# Patient Record
Sex: FEMALE | Race: ASIAN | NOT HISPANIC OR LATINO
[De-identification: names, ages, dates, MRNs, and addresses within clinical notes are randomized per-mention and may not be internally consistent; named-entity substitution may affect disease eponyms.]

---

## 2021-08-25 PROBLEM — Z00.00 ENCOUNTER FOR PREVENTIVE HEALTH EXAMINATION: Status: ACTIVE | Noted: 2021-08-25

## 2021-09-23 ENCOUNTER — APPOINTMENT (OUTPATIENT)
Dept: NEUROSURGERY | Facility: CLINIC | Age: 56
End: 2021-09-23
Payer: MEDICAID

## 2021-09-23 VITALS
DIASTOLIC BLOOD PRESSURE: 78 MMHG | BODY MASS INDEX: 27.79 KG/M2 | HEART RATE: 69 BPM | WEIGHT: 151 LBS | HEIGHT: 62 IN | SYSTOLIC BLOOD PRESSURE: 126 MMHG | OXYGEN SATURATION: 99 % | TEMPERATURE: 97.1 F

## 2021-09-23 PROCEDURE — 99203 OFFICE O/P NEW LOW 30 MIN: CPT

## 2021-10-05 NOTE — HISTORY OF PRESENT ILLNESS
[> 3 months] : more  than 3 months [FreeTextEntry1] : low back pain. [de-identified] : She has had pain in the back and leg pain. The pain is worse in the morning.  She has no numbness or tingling of the leg. She can walk three blocks. She had PT but made it worse. The bowel and bladder function is normal. She takes gabapentin for pain. She has not done pain management. The pain is worse with activity and improved by rest.

## 2021-10-05 NOTE — ASSESSMENT
[FreeTextEntry1] : She has low back pain with a grade 2 spondylolisthesis. She has not done pain management yet. Although, it may not be curable or long lasting I think it is a great option prior to surgery. I will also have her flexion extension films . I will see her back after the injection and xrays.

## 2021-10-05 NOTE — PHYSICAL EXAM
[Straight-Leg Raise Test - Left] : straight leg raise of the left leg was positive [L-Spine ___ (level)] : ~Ulevel [unfilled] lumbar spine [Restricted] : was restricted [Pain] : was painful [Pain / Temp Decrease Sole Of Foot & Posterior Leg Left Only] : S1 sensory impairment [Pain / Temp Decrease Sole Of Foot & Posterior Leg Bilateral] : S1 sensory impairment [4] : 4/5 Great Toe Extension (L5) [5] : 5/5 Ankle Plantar Flexion (S1) [Straight-Leg Raise Test - Right] : straight leg raise  of the right leg was negative [FreeTextEntry2] : left hip

## 2021-10-14 ENCOUNTER — APPOINTMENT (OUTPATIENT)
Dept: NEUROSURGERY | Facility: CLINIC | Age: 56
End: 2021-10-14

## 2022-12-28 ENCOUNTER — APPOINTMENT (OUTPATIENT)
Dept: ORTHOPEDIC SURGERY | Facility: CLINIC | Age: 57
End: 2022-12-28

## 2022-12-28 VITALS
HEIGHT: 62 IN | WEIGHT: 151 LBS | DIASTOLIC BLOOD PRESSURE: 79 MMHG | SYSTOLIC BLOOD PRESSURE: 128 MMHG | BODY MASS INDEX: 27.79 KG/M2

## 2022-12-28 PROCEDURE — 99204 OFFICE O/P NEW MOD 45 MIN: CPT

## 2022-12-28 PROCEDURE — 72110 X-RAY EXAM L-2 SPINE 4/>VWS: CPT

## 2022-12-28 NOTE — HISTORY OF PRESENT ILLNESS
[de-identified] : 56 yo female with chronic low back pain with bilateral posterior thigh to calf radiculopathy, left left worse than right. She is unable to walk more than 2 blocks due to pain. She has been treated conservatively in the past while in [place] and a year ago a neurosurgeon, Dr. Zhu recommended surgery for her. She denies any bowel or bladder dysfunction or saddle anesthesia. Her most recent MRI is over a year old. She has not had epidurals in the past. She tried physical therapy a few years ago with minimal relief of her symptoms. She is unable to sit or stand for extended period of time due to pain.

## 2022-12-28 NOTE — ASSESSMENT
[FreeTextEntry1] : I had a long discussion with the patient regards to her treatment plan and diagnosis.  She may be a surgical candidate given the degree of spondylolisthesis and her foraminal stenosis.  Furthermore she does have radicular complaints which are consistent with her area of stenosis.  I do think that trying a course of physical therapy, pain management would be appropriate.  We have prescribed her a regimen of this today.  I will see her back in approximately 3 to 4 weeks to keep a close clinical eye on the patient.  All questions were answered.

## 2023-01-16 ENCOUNTER — TRANSCRIPTION ENCOUNTER (OUTPATIENT)
Age: 58
End: 2023-01-16

## 2023-02-01 ENCOUNTER — APPOINTMENT (OUTPATIENT)
Dept: ORTHOPEDIC SURGERY | Facility: CLINIC | Age: 58
End: 2023-02-01
Payer: MEDICAID

## 2023-02-01 PROCEDURE — 99214 OFFICE O/P EST MOD 30 MIN: CPT

## 2023-02-01 NOTE — PHYSICAL EXAM
[de-identified] : Lumbar Physical Exam\par \par Antalgic gait\par \par Reflexes\par Patellar - normal\par Gastroc - normal\par Clonus - No\par \par Hip Exam - Normal\par \par Straight leg raise - none\par \par Pulses - 2+ dp/pt\par \par Range of motion - normal\par \par Sensation \par Sensation intact to light touch in L1, L2, L3, L4, L5 and S1 dermatomes bilaterally\par \par Motor\par 	IP	Quad	HS	TA	Gastroc	EHL\par Right	5/5	5/5	5/5	5/5	5/5	5/5\par Left	5/5	5/5	5/5	5/5	5/5	5/5 [de-identified] : Lumbar radiographs\par Grade 2 spondylolisthesis L5-S1\par \par Lumbar MRI reviewed from 2021\par Significant foraminal stenosis as expected with his degree of spondylolisthesis at L5-S1

## 2023-02-01 NOTE — ASSESSMENT
[FreeTextEntry1] : I had a long discussion with the patient regards to her treatment plan and diagnosis.  She may be a surgical candidate given the degree of spondylolisthesis and her foraminal stenosis.  Furthermore she does have radicular complaints which are consistent with her area of stenosis.  At this time, I do think that trying a course of physical therapy, pain management would be appropriate. I recommend following up with pain management for further treatment plan. I also advised her to obtain the MRI films and report at the location where she had the MRI done.  I will see her back in approximately 2 weeks to keep a close clinical eye on the patient.  All questions were answered.

## 2023-02-01 NOTE — HISTORY OF PRESENT ILLNESS
[de-identified] : Today, patient states her symptoms are persistent and unchanged since last visit. She states the pain is worse in the morning with associated stiffness. Patient reports obtaining a lumbar MRI but does not have the films or report today. She states she also went for an EMG study. Patient has previously seen pain management Dr. Singleton but injection was not approved. She reports doing physical therapy in the past with minimal relief to symptoms. \par \par 12/28/2022\par 56 yo female with chronic low back pain with bilateral posterior thigh to calf radiculopathy, left left worse than right. She is unable to walk more than 2 blocks due to pain. She has been treated conservatively in the past while in [place] and a year ago a neurosurgeon, Dr. Zhu recommended surgery for her. She denies any bowel or bladder dysfunction or saddle anesthesia. Her most recent MRI is over a year old. She has not had epidurals in the past. She tried physical therapy a few years ago with minimal relief of her symptoms. She is unable to sit or stand for extended period of time due to pain.

## 2023-02-01 NOTE — ADDENDUM
[FreeTextEntry1] : I, Morgan Gonzalez, acted solely as a scribe for Dr. Ricardo Batista MD on this date 02/01/2023  .\par  \par All medical record entries made by the Scribe were at my, Dr. Ricardo Batista MD., direction and personally dictated by me on 02/01/2023 . I have reviewed the chart and agree that the record accurately reflects my personal performance of the history, physical exam, assessment and plan. I have also personally directed, reviewed, and agreed with the chart.

## 2023-02-15 ENCOUNTER — APPOINTMENT (OUTPATIENT)
Dept: ORTHOPEDIC SURGERY | Facility: CLINIC | Age: 58
End: 2023-02-15
Payer: MEDICAID

## 2023-02-15 VITALS
DIASTOLIC BLOOD PRESSURE: 63 MMHG | WEIGHT: 151 LBS | BODY MASS INDEX: 27.79 KG/M2 | HEIGHT: 62 IN | SYSTOLIC BLOOD PRESSURE: 108 MMHG

## 2023-02-15 PROCEDURE — 99214 OFFICE O/P EST MOD 30 MIN: CPT

## 2023-02-15 NOTE — ASSESSMENT
[FreeTextEntry1] : I had a long discussion with the patient regards her treatment plan and diagnosis.  At this point we will continue with conservative care.  She will likely have a injection type procedure with Dr. Salinas over the coming weeks.  She should continue with physical therapy.  I will see her back in 5 to 6 weeks.  Encouraged to reach out sooner if she has any worsening symptoms.

## 2023-02-15 NOTE — PHYSICAL EXAM
[de-identified] : Cervical Physical Exam\par \par Gait - Antalgic Gait\par \par Reflexes\par Biceps - Normal\par Triceps - Normal\par Brachioradialis - Normal\par Patellar - Normal\par Gastroc - Normal\par Clonus -No\par \par Cool´s - None\par \par Shoulder Exam - Normal\par \par Spurling´s - None\par \par Wrist Pulses -2+ radial/ulnar\par \par Foot Pulses -2+ DP/PT\par \par Cervical range of motion - Normal\par \par Sensation \par C5-T1 sensation intact to light touch bilaterally\par \par L1-S1 sensation intact to light touch bilaterally\par \par Motor\par \par 	Deltoid	Biceps	Triceps	WF	WE	IO	\par Right	5/5	5/5	5/5	5/5	5/5	5/5	5/5\par Left	5/5	5/5	5/5	5/5	5/5	5/5	5/5\par \par Lumbar Physical Exam\par \par Antalgic gait\par \par Reflexes\par Patellar - normal\par Gastroc - normal\par Clonus - No\par \par Hip Exam - Normal\par \par Straight leg raise - none\par \par Pulses - 2+ dp/pt\par \par Range of motion - normal\par \par Sensation \par Sensation intact to light touch in L1, L2, L3, L4, L5 and S1 dermatomes bilaterally\par \par Motor\par 	IP	Quad	HS	TA	Gastroc	EHL\par Right	5/5	5/5	5/5	5/5	5/5	5/5\par Left	5/5	5/5	5/5	5/5	5/5	5/5 [de-identified] : Lumbar radiographs\par Grade 2 spondylolisthesis L5-S1\par \par Lumbar MRI reviewed from 2021\par Significant foraminal stenosis as expected with his degree of spondylolisthesis at L5-S1\par \par Cervical radiographs \par No instability with flexion or extension \par normal appearing disc spaces \par \par Cervical MRI\par No areas of critical central or foraminal stenosis

## 2023-02-15 NOTE — HISTORY OF PRESENT ILLNESS
[de-identified] : 57 y.o female presents for follow-up visit for her neck pain and radiculopathy. Pt reports pain radiates down to her shoulder and the posterior aspect of her LT leg. Pt reports she can walk 2 blocks but would have to stop afterwards due to the pain. Pt went to see Dr. Salinas but has not received the injection yet. Pt reports going to physical therapy with minor relief of sxs but reports pain is still persistent. \par \par 02/01/2023\par Today, patient states her symptoms are persistent and unchanged since last visit. She states the pain is worse in the morning with associated stiffness. Patient reports obtaining a lumbar MRI but does not have the films or report today. She states she also went for an EMG study. Patient has previously seen pain management Dr. Singleton but injection was not approved. She reports doing physical therapy in the past with minimal relief to symptoms. \par \par 12/28/2022\par 56 yo female with chronic low back pain with bilateral posterior thigh to calf radiculopathy, left left worse than right. She is unable to walk more than 2 blocks due to pain. She has been treated conservatively in the past while in [facility] and a year ago a neurosurgeon, Dr. Zhu recommended surgery for her. She denies any bowel or bladder dysfunction or saddle anesthesia. Her most recent MRI is over a year old. She has not had epidurals in the past. She tried physical therapy a few years ago with minimal relief of her symptoms. She is unable to sit or stand for extended period of time due to pain.

## 2023-03-29 ENCOUNTER — APPOINTMENT (OUTPATIENT)
Dept: ORTHOPEDIC SURGERY | Facility: CLINIC | Age: 58
End: 2023-03-29
Payer: MEDICAID

## 2023-03-29 VITALS
SYSTOLIC BLOOD PRESSURE: 103 MMHG | WEIGHT: 151 LBS | DIASTOLIC BLOOD PRESSURE: 63 MMHG | HEIGHT: 62 IN | BODY MASS INDEX: 27.79 KG/M2

## 2023-03-29 DIAGNOSIS — M25.559 PAIN IN UNSPECIFIED HIP: ICD-10-CM

## 2023-03-29 PROCEDURE — 72190 X-RAY EXAM OF PELVIS: CPT

## 2023-03-29 PROCEDURE — 99214 OFFICE O/P EST MOD 30 MIN: CPT

## 2023-03-29 NOTE — PHYSICAL EXAM
[de-identified] : Cervical Physical Exam\par \par Gait - Antalgic Gait\par \par Reflexes\par Biceps - Normal\par Triceps - Normal\par Brachioradialis - Normal\par Patellar - Normal\par Gastroc - Normal\par Clonus -No\par \par Cool´s - None\par \par Shoulder Exam - Normal\par \par Spurling´s - None\par \par Wrist Pulses -2+ radial/ulnar\par \par Foot Pulses -2+ DP/PT\par \par Cervical range of motion - Normal\par \par Sensation \par C5-T1 sensation intact to light touch bilaterally\par \par L1-S1 sensation intact to light touch bilaterally\par \par Motor\par \par 	Deltoid	Biceps	Triceps	WF	WE	IO	\par Right	5/5	5/5	5/5	5/5	5/5	5/5	5/5\par Left	5/5	5/5	5/5	5/5	5/5	5/5	5/5\par \par Lumbar Physical Exam\par \par Antalgic gait\par \par Reflexes\par Patellar - normal\par Gastroc - normal\par Clonus - No\par \par Hip Exam - Normal\par \par Straight leg raise - none\par \par Pulses - 2+ dp/pt\par \par Range of motion - normal\par \par Sensation \par Sensation intact to light touch in L1, L2, L3, L4, L5 and S1 dermatomes bilaterally\par \par Motor\par 	IP	Quad	HS	TA	Gastroc	EHL\par Right	5/5	5/5	5/5	5/5	5/5	5/5\par Left	5/5	5/5	5/5	5/5	5/5	5/5 [de-identified] : Lumbar radiographs\par Grade 2 spondylolisthesis L5-S1\par \par Lumbar MRI reviewed from 2021\par Significant foraminal stenosis as expected with his degree of spondylolisthesis at L5-S1\par \par Cervical radiographs \par No instability with flexion or extension \par normal appearing disc spaces \par \par Cervical MRI\par No areas of critical central or foraminal stenosis\par \par Pelvis and right hip\par Mild degen\par I do not note any obvious lesion[facility]

## 2023-03-29 NOTE — ASSESSMENT
[FreeTextEntry1] : I had a long discussion with the patient regards her treatment plan and diagnosis.  We will get an MRI of her right hip based on the x-ray findings, there are concerns of the radiologist at [facility] of a possible proximal femoral lesion.  This has been ordered today.  We will try to rule out any malignancy.  I would encourage her to continue following up with pain management, continue to be active as well.

## 2023-03-29 NOTE — HISTORY OF PRESENT ILLNESS
[de-identified] : 57 year old female who presents for follow-up evaluation of her neck pain and radicular sxs down her arms.  She also has pain in her back and bilateral lower extremities.  She does have groin pain as well.  She has been seen and treated by Dr. Salinas and has had improvement in her overall symptoms.  She is here to follow-up regarding neck steps in her care.\par \par 02/15/2023\par 57 y.o female presents for follow-up visit for her neck pain and radiculopathy. Pt reports pain radiates down to her shoulder and the posterior aspect of her LT leg. Pt reports she can walk 2 blocks but would have to stop afterwards due to the pain. Pt went to see Dr. Salinsa but has not received the injection yet. Pt reports going to physical therapy with minor relief of sxs but reports pain is still persistent. \par \par 02/01/2023\par Today, patient states her symptoms are persistent and unchanged since last visit. She states the pain is worse in the morning with associated stiffness. Patient reports obtaining a lumbar MRI but does not have the films or report today. She states she also went for an EMG study. Patient has previously seen pain management Dr. Singleton but injection was not approved. She reports doing physical therapy in the past with minimal relief to symptoms. \par \par 12/28/2022\par 56 yo female with chronic low back pain with bilateral posterior thigh to calf radiculopathy, left left worse than right. She is unable to walk more than 2 blocks due to pain. She has been treated conservatively in the past while in [place] and a year ago a neurosurgeon, Dr. Zhu recommended surgery for her. She denies any bowel or bladder dysfunction or saddle anesthesia. Her most recent MRI is over a year old. She has not had epidurals in the past. She tried physical therapy a few years ago with minimal relief of her symptoms. She is unable to sit or stand for extended period of time due to pain.

## 2023-04-11 ENCOUNTER — OUTPATIENT (OUTPATIENT)
Dept: OUTPATIENT SERVICES | Facility: HOSPITAL | Age: 58
LOS: 1 days | End: 2023-04-11
Payer: MEDICAID

## 2023-04-11 ENCOUNTER — APPOINTMENT (OUTPATIENT)
Dept: MRI IMAGING | Facility: IMAGING CENTER | Age: 58
End: 2023-04-11
Payer: MEDICAID

## 2023-04-11 DIAGNOSIS — M25.559 PAIN IN UNSPECIFIED HIP: ICD-10-CM

## 2023-04-11 PROCEDURE — 73721 MRI JNT OF LWR EXTRE W/O DYE: CPT | Mod: 26,RT

## 2023-04-11 PROCEDURE — 73721 MRI JNT OF LWR EXTRE W/O DYE: CPT

## 2023-04-24 ENCOUNTER — NON-APPOINTMENT (OUTPATIENT)
Age: 58
End: 2023-04-24

## 2023-04-26 ENCOUNTER — APPOINTMENT (OUTPATIENT)
Dept: ORTHOPEDIC SURGERY | Facility: CLINIC | Age: 58
End: 2023-04-26
Payer: MEDICAID

## 2023-04-26 VITALS
BODY MASS INDEX: 27.79 KG/M2 | SYSTOLIC BLOOD PRESSURE: 111 MMHG | DIASTOLIC BLOOD PRESSURE: 73 MMHG | WEIGHT: 151 LBS | HEIGHT: 62 IN

## 2023-04-26 PROCEDURE — 99214 OFFICE O/P EST MOD 30 MIN: CPT

## 2023-04-26 NOTE — ADDENDUM
[FreeTextEntry1] : I, Julia Morales, acted solely as a scribe for Dr. Ricardo Batista MD on this date 04/26/2023  \par \par All medical record entries made by the Scribe were at my, Dr. Ricardo Batista MD., direction and personally dictated by me on 04/26/2023 . I have reviewed the chart and agree that the record accurately reflects my personal performance of the history, physical exam, assessment and plan. I have also personally directed, reviewed, and agreed with the chart.

## 2023-04-26 NOTE — ASSESSMENT
[FreeTextEntry1] : I had a long discussion with the patient regards her treatment plan and diagnosis. The patient is progressing well with conservative management. She will continue with physical therapy, gabapentin and Tylenol as medically indicted. The patient will f/u with me in 3 months for repeat clinical evaluation where we will discuss if she needs an epidural injection at [facility]. All questions were answered.

## 2023-04-26 NOTE — HISTORY OF PRESENT ILLNESS
[de-identified] : 57 year old female who presents for follow-up evaluation of her groin pain and neck pain,back pain that radiates down her b/l lower extremities. Today, the patient reports the pain occurs in the morning and then dissipates over the course of the day. Patient reports overall the pain is better relative to her previous appt. She is taking gabapentin for sxs with relief. \par Denies any bowel/bladder incontinence. Denies any saddle anesthesia. \par \par 03/29/2023\par 57 year old female who presents for follow-up evaluation of her neck pain and radicular sxs down her arms.  She also has pain in her back and bilateral lower extremities.  She does have groin pain as well.  She has been seen and treated by Dr. Salinas and has had improvement in her overall symptoms.  She is here to follow-up regarding neck steps in her care.\par \par 02/15/2023\par 57 y.o female presents for follow-up visit for her neck pain and radiculopathy. Pt reports pain radiates down to her shoulder and the posterior aspect of her LT leg. Pt reports she can walk 2 blocks but would have to stop afterwards due to the pain. Pt went to see Dr. Salinas but has not received the injection yet. Pt reports going to physical therapy with minor relief of sxs but reports pain is still persistent. \par \par 02/01/2023\par Today, patient states her symptoms are persistent and unchanged since last visit. She states the pain is worse in the morning with associated stiffness. Patient reports obtaining a lumbar MRI but does not have the films or report today. She states she also went for an EMG study. Patient has previously seen pain management Dr. Singleton but injection was not approved. She reports doing physical therapy in the past with minimal relief to symptoms. \par \par 12/28/2022\par 58 yo female with chronic low back pain with bilateral posterior thigh to calf radiculopathy, left left worse than right. She is unable to walk more than 2 blocks due to pain. She has been treated conservatively in the past while in [place] and a year ago a neurosurgeon, Dr. Zhu recommended surgery for her. She denies any bowel or bladder dysfunction or saddle anesthesia. Her most recent MRI is over a year old. She has not had epidurals in the past. She tried physical therapy a few years ago with minimal relief of her symptoms. She is unable to sit or stand for extended period of time due to pain.

## 2023-04-26 NOTE — PHYSICAL EXAM
[de-identified] : Cervical Physical Exam\par \par Gait - Antalgic Gait\par \par Reflexes\par Biceps - Normal\par Triceps - Normal\par Brachioradialis - Normal\par Patellar - Normal\par Gastroc - Normal\par Clonus -No\par \par Cool´s - None\par \par Shoulder Exam - Normal\par \par Spurling´s - None\par \par Wrist Pulses -2+ radial/ulnar\par \par Foot Pulses -2+ DP/PT\par \par Cervical range of motion - Normal\par \par Sensation \par C5-T1 sensation intact to light touch bilaterally\par \par L1-S1 sensation intact to light touch bilaterally\par \par Motor\par \par 	Deltoid	Biceps	Triceps	WF	WE	IO	\par Right	5/5	5/5	5/5	5/5	5/5	5/5	5/5\par Left	5/5	5/5	5/5	5/5	5/5	5/5	5/5\par \par Lumbar Physical Exam\par \par Antalgic gait\par \par Reflexes\par Patellar - normal\par Gastroc - normal\par Clonus - No\par \par Hip Exam - Normal\par \par Straight leg raise - none\par \par Pulses - 2+ dp/pt\par \par Range of motion - normal\par \par Sensation \par Sensation intact to light touch in L1, L2, L3, L4, L5 and S1 dermatomes bilaterally\par \par Motor\par 	IP	Quad	HS	TA	Gastroc	EHL\par Right	5/5	5/5	5/5	5/5	5/5	5/5\par Left	5/5	5/5	5/5	5/5	5/5	5/5 [de-identified] : Lumbar radiographs\par Grade 2 spondylolisthesis L5-S1\par \par Lumbar MRI reviewed from 2021\par Significant foraminal stenosis as expected with his degree of spondylolisthesis at L5-S1\par \par Cervical radiographs \par No instability with flexion or extension \par normal appearing disc spaces \par \par Cervical MRI\par No areas of critical central or foraminal stenosis\par \par Pelvis and right hip\par Mild degen\par I do not note any obvious lesion, hip xray from [facility] states concern over malignancy\par \par Hip MRI reviewed \par no lesions, fractures or acute complications noted

## 2023-07-26 ENCOUNTER — APPOINTMENT (OUTPATIENT)
Dept: ORTHOPEDIC SURGERY | Facility: CLINIC | Age: 58
End: 2023-07-26
Payer: MEDICAID

## 2023-07-26 VITALS
BODY MASS INDEX: 27.79 KG/M2 | SYSTOLIC BLOOD PRESSURE: 94 MMHG | HEIGHT: 62 IN | WEIGHT: 151 LBS | DIASTOLIC BLOOD PRESSURE: 58 MMHG

## 2023-07-26 PROCEDURE — 99214 OFFICE O/P EST MOD 30 MIN: CPT

## 2023-07-26 RX ORDER — MELOXICAM 15 MG/1
15 TABLET ORAL DAILY
Qty: 14 | Refills: 0 | Status: ACTIVE | COMMUNITY
Start: 2023-07-26 | End: 1900-01-01

## 2023-07-26 RX ORDER — LIDOCAINE 5% 700 MG/1
5 PATCH TOPICAL
Qty: 30 | Refills: 2 | Status: ACTIVE | COMMUNITY
Start: 2023-07-26 | End: 1900-01-01

## 2023-07-26 NOTE — ASSESSMENT
[FreeTextEntry1] : I had a long discussion with the patient regards her treatment plan and diagnosis. The patient is progressing well with conservative management. She will continue with home exercise program, gabapentin and Tylenol as medically indicted. Instructed the patient should she experience a flare up of sxs she should f/u with a pain management doctor for further non-operative treatments or possibly another injection, a new referral was provided. Rx Lidocaine patches and meloxicam. Instructed the patient to continue to walk as much as possible and keep active.  The patient will f/u with me in 2 to 3 months for repeat clinical evaluation or sooner should there be any new or worsening sxs. All questions were answered.

## 2023-07-26 NOTE — PHYSICAL EXAM
[de-identified] : Cervical Physical Exam\par \par Gait - Antalgic Gait\par \par Reflexes\par Biceps - Normal\par Triceps - Normal\par Brachioradialis - Normal\par Patellar - Normal\par Gastroc - Normal\par Clonus -No\par \par Cool´s - None\par \par Shoulder Exam - Normal\par \par Spurling´s - None\par \par Wrist Pulses -2+ radial/ulnar\par \par Foot Pulses -2+ DP/PT\par \par Cervical range of motion - Normal\par \par Sensation \par C5-T1 sensation intact to light touch bilaterally\par \par L1-S1 sensation intact to light touch bilaterally\par \par Motor\par \par 	Deltoid	Biceps	Triceps	WF	WE	IO	\par Right	5/5	5/5	5/5	5/5	5/5	5/5	5/5\par Left	5/5	5/5	5/5	5/5	5/5	5/5	5/5\par \par Lumbar Physical Exam\par \par Antalgic gait\par \par Reflexes\par Patellar - normal\par Gastroc - normal\par Clonus - No\par \par Hip Exam - Normal\par \par Straight leg raise - none\par \par Pulses - 2+ dp/pt\par \par Range of motion - normal\par \par Sensation \par Sensation intact to light touch in L1, L2, L3, L4, L5 and S1 dermatomes bilaterally\par \par Motor\par 	IP	Quad	HS	TA	Gastroc	EHL\par Right	5/5	5/5	5/5	5/5	5/5	5/5\par Left	5/5	5/5	5/5	5/5	5/5	5/5 [de-identified] : Lumbar radiographs\par Grade 2 spondylolisthesis L5-S1\par \par Lumbar MRI reviewed from 2021\par Significant foraminal stenosis as expected with his degree of spondylolisthesis at L5-S1\par \par Cervical radiographs \par No instability with flexion or extension \par normal appearing disc spaces \par \par Cervical MRI\par No areas of critical central or foraminal stenosis\par \par Pelvis and right hip\par Mild degen\par I do not note any obvious lesion, hip xray from [facility] states concern over malignancy\par \par Hip MRI reviewed \par no lesions, fractures or acute complications noted

## 2023-07-26 NOTE — HISTORY OF PRESENT ILLNESS
[de-identified] : 57 year old female who presents for follow-up evaluation of her neck pain that radiates up into her jaw. Patient reports she attended physical therapy which exacerbated her sxs so she stopped attending. She reports her back pain and radicular sxs down her LT lower extremity to her toes has dissipated with the use of gabapentin, however she reports recent flare up. \par Denies any bowel/bladder incontinence. Denies any saddle anesthesia. \par \par 04/26/2023\par 57 year old female who presents for follow-up evaluation of her groin pain and neck pain,back pain that radiates down her b/l lower extremities. Today, the patient reports the pain occurs in the morning and then dissipates over the course of the day. Patient reports overall the pain is better relative to her previous appt. She is taking gabapentin for sxs with relief. \par Denies any bowel/bladder incontinence. Denies any saddle anesthesia. \par \par 03/29/2023\par 57 year old female who presents for follow-up evaluation of her neck pain and radicular sxs down her arms.  She also has pain in her back and bilateral lower extremities.  She does have groin pain as well.  She has been seen and treated by Dr. Salinas and has had improvement in her overall symptoms.  She is here to follow-up regarding neck steps in her care.\par \par 02/15/2023\par 57 y.o female presents for follow-up visit for her neck pain and radiculopathy. Pt reports pain radiates down to her shoulder and the posterior aspect of her LT leg. Pt reports she can walk 2 blocks but would have to stop afterwards due to the pain. Pt went to see Dr. Salinas but has not received the injection yet. Pt reports going to physical therapy with minor relief of sxs but reports pain is still persistent. \par \par 02/01/2023\par Today, patient states her symptoms are persistent and unchanged since last visit. She states the pain is worse in the morning with associated stiffness. Patient reports obtaining a lumbar MRI but does not have the films or report today. She states she also went for an EMG study. Patient has previously seen pain management Dr. Singleton but injection was not approved. She reports doing physical therapy in the past with minimal relief to symptoms. \par \par 12/28/2022\par 56 yo female with chronic low back pain with bilateral posterior thigh to calf radiculopathy, left left worse than right. She is unable to walk more than 2 blocks due to pain. She has been treated conservatively in the past while in [facility] and a year ago a neurosurgeon, Dr. Zhu recommended surgery for her. She denies any bowel or bladder dysfunction or saddle anesthesia. Her most recent MRI is over a year old. She has not had epidurals in the past. She tried physical therapy a few years ago with minimal relief of her symptoms. She is unable to sit or stand for extended period of time due to pain.

## 2023-09-20 ENCOUNTER — APPOINTMENT (OUTPATIENT)
Dept: ORTHOPEDIC SURGERY | Facility: CLINIC | Age: 58
End: 2023-09-20
Payer: MEDICAID

## 2023-09-20 VITALS — OXYGEN SATURATION: 95 % | HEART RATE: 63 BPM | DIASTOLIC BLOOD PRESSURE: 68 MMHG | SYSTOLIC BLOOD PRESSURE: 117 MMHG

## 2023-09-20 DIAGNOSIS — M43.17 SPONDYLOLISTHESIS, LUMBOSACRAL REGION: ICD-10-CM

## 2023-09-20 PROCEDURE — 99215 OFFICE O/P EST HI 40 MIN: CPT

## 2024-04-03 ENCOUNTER — APPOINTMENT (OUTPATIENT)
Dept: ORTHOPEDIC SURGERY | Facility: CLINIC | Age: 59
End: 2024-04-03
Payer: MEDICAID

## 2024-04-03 DIAGNOSIS — M54.9 DORSALGIA, UNSPECIFIED: ICD-10-CM

## 2024-04-03 PROCEDURE — 99214 OFFICE O/P EST MOD 30 MIN: CPT

## 2024-04-03 NOTE — HISTORY OF PRESENT ILLNESS
[de-identified] : Patient is a 58 year old female who presents for f/u eval of her np. Patient states her sxs have improved. Obtained injection which provided relief of her sxs.   09.20.23 58 year old female who presents for follow-up evaluation of her neck pain. Today, the patient reports she feels better relative to her previous visit. Reports her neck and back sxs have alleviated and only occur in the morning, but are manageable. Reports limited ROM of neck due to pain.  Denies any bowel/bladder incontinence. Denies any saddle anesthesia.   07/26/2023 57 year old female who presents for follow-up evaluation of her neck pain that radiates up into her jaw. Patient reports she attended physical therapy which exacerbated her sxs so she stopped attending. She reports her back pain and radicular sxs down her LT lower extremity to her toes has dissipated with the use of gabapentin, however she reports recent flare up.  Denies any bowel/bladder incontinence. Denies any saddle anesthesia.   04/26/2023 57 year old female who presents for follow-up evaluation of her groin pain and neck pain,back pain that radiates down her b/l lower extremities. Today, the patient reports the pain occurs in the morning and then dissipates over the course of the day. Patient reports overall the pain is better relative to her previous appt. She is taking gabapentin for sxs with relief.  Denies any bowel/bladder incontinence. Denies any saddle anesthesia.   03/29/2023 57 year old female who presents for follow-up evaluation of her neck pain and radicular sxs down her arms.  She also has pain in her back and bilateral lower extremities.  She does have groin pain as well.  She has been seen and treated by Dr. Salinas and has had improvement in her overall symptoms.  She is here to follow-up regarding neck steps in her care.  02/15/2023 57 y.o female presents for follow-up visit for her neck pain and radiculopathy. Pt reports pain radiates down to her shoulder and the posterior aspect of her LT leg. Pt reports she can walk 2 blocks but would have to stop afterwards due to the pain. Pt went to see Dr. Salinas but has not received the injection yet. Pt reports going to physical therapy with minor relief of sxs but reports pain is still persistent.   02/01/2023 Today, patient states her symptoms are persistent and unchanged since last visit. She states the pain is worse in the morning with associated stiffness. Patient reports obtaining a lumbar MRI but does not have the films or report today. She states she also went for an EMG study. Patient has previously seen pain management Dr. Singleton but injection was not approved. She reports doing physical therapy in the past with minimal relief to symptoms.   12/28/2022 56 yo female with chronic low back pain with bilateral posterior thigh to calf radiculopathy, left left worse than right. She is unable to walk more than 2 blocks due to pain. She has been treated conservatively in the past [place] and a year ago a neurosurgeon, Dr. Zhu recommended surgery for her. She denies any bowel or bladder dysfunction or saddle anesthesia. Her most recent MRI is over a year old. She has not had epidurals in the past. She tried physical therapy a few years ago with minimal relief of her symptoms. She is unable to sit or stand for extended period of time due to pain.

## 2024-04-03 NOTE — ADDENDUM
[FreeTextEntry1] :  I, Neva Santamaria, acted solely as a scribe for Dr. Ricardo Batista MD on this date 04/03/2024   All medical record entries made by the Scribe were at my, Dr. Ricardo Batista MD., direction and personally dictated by me on 04/03/2024. I have reviewed the chart and agree that the record accurately reflects my personal performance of the history, physical exam, assessment and plan. I have also personally directed, reviewed, and agreed with the chart.

## 2024-04-03 NOTE — PHYSICAL EXAM
[de-identified] : Cervical Physical Exam  Gait - Nml  Reflexes Biceps - Normal Triceps - Normal Brachioradialis - Normal Patellar - Normal Gastroc - Normal Clonus -No  Hoffmans - None  Shoulder Exam - Normal  Spurlings - None  Wrist Pulses -2+ radial/ulnar  Foot Pulses -2+ DP/PT  Cervical range of motion - Normal  Sensation  C5-T1 sensation intact to light touch bilaterally  L1-S1 sensation intact to light touch bilaterally  Motor  	Deltoid	Biceps	Triceps	WF	WE	IO	 Right	5/5	5/5	5/5	5/5	5/5	5/5	5/5 Left	5/5	5/5	5/5	5/5	5/5	5/5	5/5  Lumbar Physical Exam  nml  Reflexes Patellar - normal Gastroc - normal Clonus - No  Hip Exam - Normal  Straight leg raise - none  Pulses - 2+ dp/pt  Range of motion - normal  Sensation  Sensation intact to light touch in L1, L2, L3, L4, L5 and S1 dermatomes bilaterally  Motor 	IP	Quad	HS	TA	Gastroc	EHL Right	5/5	5/5	5/5	5/5	5/5	5/5 Left	5/5	5/5	5/5	5/5	5/5	5/5 [de-identified] : Lumbar radiographs\par  Grade 2 spondylolisthesis L5-S1\par  \par  Lumbar MRI reviewed from 2021\par  Significant foraminal stenosis as expected with his degree of spondylolisthesis at L5-S1\par  \par  Cervical radiographs \par  No instability with flexion or extension \par  normal appearing disc spaces \par  \par  Cervical MRI\par  No areas of critical central or foraminal stenosis\par  \par  Pelvis and right hip\par  Mild degen\par  I do not note any obvious lesion, hip xray from [facility] states concern over malignancy\par  \par  Hip MRI reviewed \par  no lesions, fractures or acute complications noted

## 2024-04-03 NOTE — ASSESSMENT
[FreeTextEntry1] : I had a lengthy discussion with the patient in regard to treatment plan and diagnosis. There are no red flag findings on imaging nor are there any red flag findings on clinical exams. Therefore, we will proceed with a course of conservative treatment. This would include continuing ADLs, home exercise program, Gabapentin, Tylenol, NSAIDs as medically indicated. The patient will follow up with me prn. I encouraged the patient to follow-up sooner if there are any new or worsening symptoms.

## 2024-10-30 ENCOUNTER — APPOINTMENT (OUTPATIENT)
Dept: ORTHOPEDIC SURGERY | Facility: CLINIC | Age: 59
End: 2024-10-30
Payer: MEDICAID

## 2024-10-30 DIAGNOSIS — M54.9 DORSALGIA, UNSPECIFIED: ICD-10-CM

## 2024-10-30 PROCEDURE — 99213 OFFICE O/P EST LOW 20 MIN: CPT

## 2024-12-11 ENCOUNTER — APPOINTMENT (OUTPATIENT)
Dept: ORTHOPEDIC SURGERY | Facility: CLINIC | Age: 59
End: 2024-12-11
Payer: MEDICAID

## 2024-12-11 DIAGNOSIS — M54.9 DORSALGIA, UNSPECIFIED: ICD-10-CM

## 2024-12-11 PROCEDURE — 99214 OFFICE O/P EST MOD 30 MIN: CPT

## 2025-03-11 ENCOUNTER — APPOINTMENT (OUTPATIENT)
Dept: OTOLARYNGOLOGY | Facility: CLINIC | Age: 60
End: 2025-03-11
Payer: MEDICAID

## 2025-03-11 VITALS
WEIGHT: 160 LBS | SYSTOLIC BLOOD PRESSURE: 124 MMHG | BODY MASS INDEX: 28.35 KG/M2 | HEIGHT: 63 IN | HEART RATE: 73 BPM | DIASTOLIC BLOOD PRESSURE: 64 MMHG

## 2025-03-11 DIAGNOSIS — L29.9 PRURITUS, UNSPECIFIED: ICD-10-CM

## 2025-03-11 DIAGNOSIS — H92.09 OTALGIA, UNSPECIFIED EAR: ICD-10-CM

## 2025-03-11 PROCEDURE — 99203 OFFICE O/P NEW LOW 30 MIN: CPT

## 2025-03-11 PROCEDURE — G2211 COMPLEX E/M VISIT ADD ON: CPT | Mod: NC

## 2025-03-11 RX ORDER — MOMETASONE FUROATE 1 MG/ML
0.1 SOLUTION TOPICAL
Qty: 1 | Refills: 5 | Status: ACTIVE | COMMUNITY
Start: 2025-03-11 | End: 1900-01-01

## 2025-05-13 ENCOUNTER — APPOINTMENT (OUTPATIENT)
Dept: OTOLARYNGOLOGY | Facility: CLINIC | Age: 60
End: 2025-05-13
Payer: MEDICAID

## 2025-05-13 DIAGNOSIS — H92.09 OTALGIA, UNSPECIFIED EAR: ICD-10-CM

## 2025-05-13 DIAGNOSIS — M26.609 UNSPECIFIED TEMPOROMANDIBULAR JOINT DISORDER: ICD-10-CM

## 2025-05-13 PROCEDURE — 99214 OFFICE O/P EST MOD 30 MIN: CPT

## 2025-05-13 PROCEDURE — G2211 COMPLEX E/M VISIT ADD ON: CPT | Mod: NC

## 2025-05-13 RX ORDER — DICLOFENAC SODIUM 3 G/100G
3 GEL TOPICAL TWICE DAILY
Qty: 1 | Refills: 2 | Status: ACTIVE | COMMUNITY
Start: 2025-05-13 | End: 1900-01-01

## 2025-05-14 ENCOUNTER — APPOINTMENT (OUTPATIENT)
Dept: ORTHOPEDIC SURGERY | Facility: CLINIC | Age: 60
End: 2025-05-14
Payer: MEDICAID

## 2025-05-14 DIAGNOSIS — M43.17 SPONDYLOLISTHESIS, LUMBOSACRAL REGION: ICD-10-CM

## 2025-05-14 PROCEDURE — 99215 OFFICE O/P EST HI 40 MIN: CPT
